# Patient Record
Sex: FEMALE | Race: WHITE | Employment: FULL TIME | URBAN - NONMETROPOLITAN AREA
[De-identification: names, ages, dates, MRNs, and addresses within clinical notes are randomized per-mention and may not be internally consistent; named-entity substitution may affect disease eponyms.]

---

## 2017-04-12 ENCOUNTER — OFFICE VISIT (OUTPATIENT)
Dept: ONCOLOGY | Age: 42
End: 2017-04-12

## 2017-04-12 VITALS
DIASTOLIC BLOOD PRESSURE: 71 MMHG | OXYGEN SATURATION: 97 % | SYSTOLIC BLOOD PRESSURE: 127 MMHG | BODY MASS INDEX: 28.54 KG/M2 | WEIGHT: 177.6 LBS | HEIGHT: 66 IN | TEMPERATURE: 97.5 F | RESPIRATION RATE: 18 BRPM | HEART RATE: 81 BPM

## 2017-04-12 DIAGNOSIS — Z79.810 USE OF TAMOXIFEN (NOLVADEX): ICD-10-CM

## 2017-04-12 DIAGNOSIS — Z90.13 H/O MASTECTOMY, BILATERAL: ICD-10-CM

## 2017-04-12 DIAGNOSIS — C50.412 MALIGNANT NEOPLASM OF UPPER-OUTER QUADRANT OF LEFT FEMALE BREAST (HCC): Primary | ICD-10-CM

## 2017-04-12 PROCEDURE — 99214 OFFICE O/P EST MOD 30 MIN: CPT | Performed by: INTERNAL MEDICINE

## 2017-04-12 PROCEDURE — G8419 CALC BMI OUT NRM PARAM NOF/U: HCPCS | Performed by: INTERNAL MEDICINE

## 2017-04-12 PROCEDURE — 1036F TOBACCO NON-USER: CPT | Performed by: INTERNAL MEDICINE

## 2017-04-12 PROCEDURE — G8427 DOCREV CUR MEDS BY ELIG CLIN: HCPCS | Performed by: INTERNAL MEDICINE

## 2017-04-12 RX ORDER — TAMOXIFEN CITRATE 20 MG/1
20 TABLET ORAL DAILY
Qty: 90 TABLET | Refills: 3 | Status: SHIPPED | OUTPATIENT
Start: 2017-04-12

## 2017-04-12 ASSESSMENT — ENCOUNTER SYMPTOMS
BACK PAIN: 0
ABDOMINAL PAIN: 0
COUGH: 0
HEMOPTYSIS: 0
NAUSEA: 0
ORTHOPNEA: 0
VOMITING: 0
CONSTIPATION: 0
SHORTNESS OF BREATH: 0
STRIDOR: 0

## 2017-10-11 ENCOUNTER — OFFICE VISIT (OUTPATIENT)
Dept: ONCOLOGY | Age: 42
End: 2017-10-11
Payer: COMMERCIAL

## 2017-10-11 ENCOUNTER — HOSPITAL ENCOUNTER (OUTPATIENT)
Dept: INFUSION THERAPY | Age: 42
Discharge: HOME | End: 2017-10-11
Payer: COMMERCIAL

## 2017-10-11 VITALS
WEIGHT: 173.2 LBS | DIASTOLIC BLOOD PRESSURE: 70 MMHG | BODY MASS INDEX: 27.83 KG/M2 | RESPIRATION RATE: 18 BRPM | HEIGHT: 66 IN | SYSTOLIC BLOOD PRESSURE: 111 MMHG | TEMPERATURE: 97.7 F | OXYGEN SATURATION: 100 % | HEART RATE: 81 BPM

## 2017-10-11 DIAGNOSIS — Z79.810 USE OF TAMOXIFEN (NOLVADEX): ICD-10-CM

## 2017-10-11 DIAGNOSIS — Z90.13 H/O MASTECTOMY, BILATERAL: ICD-10-CM

## 2017-10-11 DIAGNOSIS — C50.412 MALIGNANT NEOPLASM OF UPPER-OUTER QUADRANT OF LEFT FEMALE BREAST (HCC): Primary | ICD-10-CM

## 2017-10-11 PROCEDURE — 99211 OFF/OP EST MAY X REQ PHY/QHP: CPT

## 2017-10-11 PROCEDURE — 99214 OFFICE O/P EST MOD 30 MIN: CPT | Performed by: INTERNAL MEDICINE

## 2017-10-11 PROCEDURE — 1036F TOBACCO NON-USER: CPT | Performed by: INTERNAL MEDICINE

## 2017-10-11 PROCEDURE — G8484 FLU IMMUNIZE NO ADMIN: HCPCS | Performed by: INTERNAL MEDICINE

## 2017-10-11 PROCEDURE — G8417 CALC BMI ABV UP PARAM F/U: HCPCS | Performed by: INTERNAL MEDICINE

## 2017-10-11 PROCEDURE — G8427 DOCREV CUR MEDS BY ELIG CLIN: HCPCS | Performed by: INTERNAL MEDICINE

## 2017-10-11 ASSESSMENT — ENCOUNTER SYMPTOMS
ORTHOPNEA: 0
NAUSEA: 0
CONSTIPATION: 0
VOMITING: 0
SHORTNESS OF BREATH: 0
HEMOPTYSIS: 0
ABDOMINAL PAIN: 0
STRIDOR: 0
BACK PAIN: 0
COUGH: 0

## 2017-10-11 NOTE — PROGRESS NOTES
History of Present Illness:  Onesimo Wood is a 37 y.o. woman with a history of stage I C ER/MA (+) breast cancer diagnosis in 2009. Oncologic Hx:  Her breast cancer was initially discovered by self-exam when patient palpated a left breast mass. This was subsequently evaluated with a mammogram on 10/20/09 that revealed dense tissue and the palpable mass was not seen. An ultrasound was then performed which revealed a 1.4 cm mass at the 5:00 position along with a 5 mm anechoic area in the upper outer quadrant. Patient underwent left breast core biopsy at the 6 o'clock position on which revealed a grade 2 invasive ductal carcinoma with associated DCIS. ER/MA was 100%, Her-2/ignacio negative by FISH, and Ki-67 was 20%. Another left breast biopsy at the 11 o'clock position revealed breast tissue with papillary apocrine metaplasia. She then underwent left mastectomy and sentinel lymph node biopsy and right prophylactic mastectomy performed by Dr. Suri Murry on 11/12/09. Pathology revealed a 1.2 cm, grade 2 invasive ductal carcinoma with intermediate grade DCIS with negative margins, 0/9 sentinel/axillary nodes were positive. Right mastectomy path revealed benign breast tissue and no evidence of malignancy. She had placement of bilateral tissue expanders by Dr. sIabelle Clemons. Her tumor was sent for Oncotype DX testing which showed an intermediate recurrence score of 22 placing the patient at 14% recurrence risk with 5 years of tamoxifen. Patient declined adjuvant chemotherapy and began adjuvant hormonal therapy with tamoxifen on 12/21/09. She stopped Tamoxifen on March 10, 2014 in anticipation of trying to get pregnant. After having 2 miscarriages, she decided to try IVF which was successful and in January 2017 she delivered without any complication healthy boy.    She was seen by her surgeon at the [facility] Dr. Suri Murry in February 2017     Interim history on October 11, 2017:  She feels very well today and denies fever and weight loss. Respiratory: Negative for cough, hemoptysis, shortness of breath and stridor. Cardiovascular: Negative for chest pain, palpitations, orthopnea and leg swelling. Gastrointestinal: Negative for abdominal pain, constipation, nausea and vomiting. Genitourinary: Negative for dysuria, hematuria and urgency. Musculoskeletal: Negative for back pain and joint pain. Skin: Negative for rash. Neurological: Negative for dizziness, tingling, speech change, focal weakness and headaches. Psychiatric/Behavioral: Negative for depression. The patient does not have insomnia. Physical Exam:     Vitals:    10/11/17 0901   BP: 111/70   Pulse: 81   Resp: 18   Temp: 97.7 °F (36.5 °C)   SpO2: 100%       ECOG 0  Constitutional: She is AAOx3 and in NAD. Head: Normocephalic and atraumatic. Mouth/Throat: Oropharynx is clear and moist. No oropharyngeal exudate. Eyes: Conjunctivae are normal. No scleral icterus. Neck: Normal range of motion. Neck supple. Cardiovascular: Normal rate, regular rhythm and normal heart sounds. No murmur heard. No LE edema noted. Pulmonary/Chest: Effort normal and breath sounds normal. No respiratory distress. She has no wheezes. She has no rales. Breast/Axilla: She is status post bilateral mastectomy and reconstruction. No masses or adenopathy bilaterally. Abdominal: Soft. Bowel sounds are normal. She exhibits no distension and no mass. No tenderness. Musculoskeletal: Normal range of motion. She exhibits no joint tenderness or swelling. Lymphadenopathy: She has no cervical, supraclavicular or axillary adenopathy. Neurological: She is alert and oriented to person, place, and time. Gait normal.   Skin: Skin is warm and dry. No rash noted. She is not diaphoretic. No erythema. No pallor. Psychiatric: Affect normal.     Assessment and Plan:  Caren Liao is a 37 y.o. woman with a history of stage I ER/ND (+) breast cancer diagnosed in 2009.  She was on Tamoxifen from 2009 till March 10, 2014. In 2014 we decided to proceed with break adjuvant hormonal treatment with tamoxifen to allow the patient to get pregnant. Patient underwent IVF and in January 2017 ago she delivered without any complication healthy baby boy. She reports that after delivery she had mild mastitis involving residual breast tissue in her right breast.  Those symptoms are resolved and on today's physical examination her reconstructed breast are unremarkable. She denies having new complaints or symptoms. She is currently on extended hormonal therapy with tamoxifen. All studies showed additional benefit from 10 versus 5 years of tamoxifen treatment. She tolerates tamoxifen well, there is no evidence of disease recurrence on today's physical examination. She was instructed to continue tamoxifen I will see the patient again in my clinic in July 2018.

## 2018-08-08 ENCOUNTER — HOSPITAL ENCOUNTER (OUTPATIENT)
Dept: INFUSION THERAPY | Age: 43
Discharge: HOME | End: 2018-08-08
Payer: COMMERCIAL

## 2018-08-08 ENCOUNTER — OFFICE VISIT (OUTPATIENT)
Dept: ONCOLOGY | Age: 43
End: 2018-08-08
Payer: COMMERCIAL

## 2018-08-08 VITALS
RESPIRATION RATE: 16 BRPM | HEART RATE: 75 BPM | HEIGHT: 66 IN | WEIGHT: 173 LBS | BODY MASS INDEX: 27.8 KG/M2 | DIASTOLIC BLOOD PRESSURE: 75 MMHG | TEMPERATURE: 98.2 F | OXYGEN SATURATION: 100 % | SYSTOLIC BLOOD PRESSURE: 115 MMHG

## 2018-08-08 DIAGNOSIS — Z79.810 USE OF TAMOXIFEN (NOLVADEX): ICD-10-CM

## 2018-08-08 DIAGNOSIS — C50.412 MALIGNANT NEOPLASM OF UPPER-OUTER QUADRANT OF LEFT BREAST IN FEMALE, ESTROGEN RECEPTOR POSITIVE (HCC): Primary | ICD-10-CM

## 2018-08-08 DIAGNOSIS — Z17.0 MALIGNANT NEOPLASM OF UPPER-OUTER QUADRANT OF LEFT BREAST IN FEMALE, ESTROGEN RECEPTOR POSITIVE (HCC): Primary | ICD-10-CM

## 2018-08-08 DIAGNOSIS — Z90.13 H/O MASTECTOMY, BILATERAL: ICD-10-CM

## 2018-08-08 PROCEDURE — 1036F TOBACCO NON-USER: CPT | Performed by: INTERNAL MEDICINE

## 2018-08-08 PROCEDURE — G8417 CALC BMI ABV UP PARAM F/U: HCPCS | Performed by: INTERNAL MEDICINE

## 2018-08-08 PROCEDURE — G8427 DOCREV CUR MEDS BY ELIG CLIN: HCPCS | Performed by: INTERNAL MEDICINE

## 2018-08-08 PROCEDURE — 99211 OFF/OP EST MAY X REQ PHY/QHP: CPT

## 2018-08-08 PROCEDURE — 99213 OFFICE O/P EST LOW 20 MIN: CPT | Performed by: INTERNAL MEDICINE

## 2018-08-08 ASSESSMENT — ENCOUNTER SYMPTOMS
CONSTIPATION: 0
VOMITING: 0
ABDOMINAL PAIN: 0
BACK PAIN: 0
SHORTNESS OF BREATH: 0
ORTHOPNEA: 0
NAUSEA: 0
COUGH: 0
HEMOPTYSIS: 0
STRIDOR: 0

## 2018-08-08 NOTE — PROGRESS NOTES
from 2009 till March 10, 2014. In 2014 she had break from adjuvant hormonal treatment with tamoxifen to allow the patient to get pregnant. Patient underwent IVF and in January 2017 she delivered without any complication healthy baby boy. She restarted tamoxifen in March 2017   She is currently on extended hormonal therapy with tamoxifen. All studies showed additional benefit from 10 versus 5 years of tamoxifen treatment. She tolerates tamoxifen well, there is no evidence of disease recurrence on today's physical examination.     She will return to clinic in 1 year

## 2019-08-28 ENCOUNTER — HOSPITAL ENCOUNTER (OUTPATIENT)
Dept: INFUSION THERAPY | Age: 44
Discharge: HOME | End: 2019-08-28
Payer: COMMERCIAL

## 2019-08-28 ENCOUNTER — OFFICE VISIT (OUTPATIENT)
Dept: ONCOLOGY | Age: 44
End: 2019-08-28
Payer: COMMERCIAL

## 2019-08-28 VITALS
DIASTOLIC BLOOD PRESSURE: 73 MMHG | TEMPERATURE: 97.8 F | WEIGHT: 165.4 LBS | HEART RATE: 78 BPM | OXYGEN SATURATION: 100 % | BODY MASS INDEX: 26.58 KG/M2 | RESPIRATION RATE: 14 BRPM | HEIGHT: 66 IN | SYSTOLIC BLOOD PRESSURE: 112 MMHG

## 2019-08-28 DIAGNOSIS — C50.412 MALIGNANT NEOPLASM OF UPPER-OUTER QUADRANT OF LEFT BREAST IN FEMALE, ESTROGEN RECEPTOR POSITIVE (HCC): Primary | ICD-10-CM

## 2019-08-28 DIAGNOSIS — Z90.13 H/O MASTECTOMY, BILATERAL: ICD-10-CM

## 2019-08-28 DIAGNOSIS — Z17.0 MALIGNANT NEOPLASM OF UPPER-OUTER QUADRANT OF LEFT BREAST IN FEMALE, ESTROGEN RECEPTOR POSITIVE (HCC): Primary | ICD-10-CM

## 2019-08-28 DIAGNOSIS — Z79.810 USE OF TAMOXIFEN (NOLVADEX): ICD-10-CM

## 2019-08-28 PROCEDURE — 99211 OFF/OP EST MAY X REQ PHY/QHP: CPT

## 2019-08-28 PROCEDURE — 99214 OFFICE O/P EST MOD 30 MIN: CPT | Performed by: INTERNAL MEDICINE

## 2019-08-28 ASSESSMENT — ENCOUNTER SYMPTOMS
ORTHOPNEA: 0
SHORTNESS OF BREATH: 0
BACK PAIN: 0
HEMOPTYSIS: 0
COUGH: 0
NAUSEA: 0
STRIDOR: 0
CONSTIPATION: 0
VOMITING: 0
ABDOMINAL PAIN: 0

## 2020-08-26 ENCOUNTER — HOSPITAL ENCOUNTER (OUTPATIENT)
Dept: INFUSION THERAPY | Age: 45
Discharge: HOME | End: 2020-08-26
Payer: COMMERCIAL

## 2020-08-26 ENCOUNTER — OFFICE VISIT (OUTPATIENT)
Dept: ONCOLOGY | Age: 45
End: 2020-08-26
Payer: COMMERCIAL

## 2020-08-26 VITALS
BODY MASS INDEX: 27.06 KG/M2 | SYSTOLIC BLOOD PRESSURE: 116 MMHG | RESPIRATION RATE: 18 BRPM | TEMPERATURE: 97.9 F | OXYGEN SATURATION: 100 % | HEIGHT: 67 IN | WEIGHT: 172.4 LBS | DIASTOLIC BLOOD PRESSURE: 65 MMHG | HEART RATE: 82 BPM

## 2020-08-26 PROCEDURE — 99214 OFFICE O/P EST MOD 30 MIN: CPT | Performed by: INTERNAL MEDICINE

## 2020-08-26 PROCEDURE — 99211 OFF/OP EST MAY X REQ PHY/QHP: CPT

## 2020-08-26 ASSESSMENT — ENCOUNTER SYMPTOMS
COUGH: 0
SHORTNESS OF BREATH: 0
HEMOPTYSIS: 0
ABDOMINAL PAIN: 0
CONSTIPATION: 0
NAUSEA: 0
BACK PAIN: 0
ORTHOPNEA: 0
VOMITING: 0
STRIDOR: 0

## 2020-08-26 NOTE — PROGRESS NOTES
History of Present Illness:  Hussein Hobson is a 40 y.o. woman with a history of stage I C ER/NE (+) breast cancer diagnosis in 2009. Oncologic Hx:  Her breast cancer was initially discovered by self-exam when patient palpated a left breast mass. This was subsequently evaluated with a mammogram on 10/20/09 that revealed dense tissue and the palpable mass was not seen. An ultrasound was then performed which revealed a 1.4 cm mass at the 5:00 position along with a 5 mm anechoic area in the upper outer quadrant. Patient underwent left breast core biopsy at the 6 o'clock position on which revealed a grade 2 invasive ductal carcinoma with associated DCIS. ER/NE was 100%, Her-2/ignacio negative by FISH, and Ki-67 was 20%. Another left breast biopsy at the 11 o'clock position revealed breast tissue with papillary apocrine metaplasia. She then underwent left mastectomy and sentinel lymph node biopsy and right prophylactic mastectomy performed by Dr. Miladys Tracy on 11/12/09. Pathology revealed a 1.2 cm, grade 2 invasive ductal carcinoma with intermediate grade DCIS with negative margins, 0/9 sentinel/axillary nodes were positive. Right mastectomy path revealed benign breast tissue and no evidence of malignancy. She had placement of bilateral tissue expanders by Dr. Ruby Little. Her tumor was sent for Oncotype DX testing which showed an intermediate recurrence score of 22 placing the patient at 14% recurrence risk with 5 years of tamoxifen. Patient declined adjuvant chemotherapy and began adjuvant hormonal therapy with tamoxifen on 12/21/09. She stopped Tamoxifen on March 10, 2014 in anticipation of trying to get pregnant. After having 2 miscarriages, she decided to try IVF which was successful and in January 2017 she delivered without any complication healthy boy. She was seen by her surgeon at the [facility] Dr. Miladys Tracy in July 2020. Interim history on August 26, 2020: This is a 1 year follow-up visit.   She denies having any bone pain, no SOB, no chest wall pain. She tolerates tamoxifen well with minimal hot flashes. She reports regular periods, she had annual gynecological examination her Pap smear was normal.  The patient denies having any concerns related to her reconstructed breasts. Past Medical History    Breast CA 08/29/2009   left invasive ductal, ER,CO+, [id number] Negative    GERD (gastroesophageal reflux disease)    Hives 2007    Tattoo 6/10 & 8/10    Foot fracture, left 11/2011    Hypothyroidism 2015 started synthroid    Miscarriage within last 12 months 2015   Past Surgical History:   Procedure Laterality Date    BREAST BIOPSY Left 09/08/2009    BREAST RECONSTRUCTION  2010    LYMPH NODE BIOPSY Left 12/11/2009    Left axillary lymph node biopsy    MASTECTOMY, BILATERAL  11/12/2009    WISDOM TOOTH EXTRACTION       Current Outpatient Medications   Medication Sig Dispense Refill    tamoxifen (NOLVADEX) 20 MG tablet Take 1 tablet by mouth daily 90 tablet 3    levothyroxine (SYNTHROID) 50 MCG tablet Take 50 mcg by mouth Daily       No current facility-administered medications for this visit. Allergies   Allergen Reactions    Dha-Epa-Vitamin E Swelling   [place]ian [employer] fish, only eaten once.          Social History Main Topics    Smoking status: Never Smoker    Smokeless tobacco: Never Used    Alcohol Use: 0.0 oz/week   Comment: \"social\"    Drug Use: No    Sexual Activity: Not on file         Family History   Problem Relation Age of Onset    Colorectal Cancer Father 46   in situ, polypectomy    Cancer- Other Brother 29   bronchogenic carcinoid tumor    Cancer- Other Maternal Uncle   testicular    Uterine Cancer Maternal Grandmother 78    Diabetes Maternal Grandmother    Hypertension Maternal Grandmother    Colorectal Cancer Paternal Grandmother 80    Hypertension Paternal Grandmother    Colorectal Cancer Paternal Grandfather 80    Coronary Artery Disease Paternal normal.     Assessment and Plan:  Roxann Rooney is a 38 y.o. woman with a history of stage I ER/OH (+) breast cancer diagnosed in 2009. She was on Tamoxifen from 2009 till March 10, 2014. In 2014 she had break from adjuvant hormonal treatment with tamoxifen to allow the patient to get pregnant. Patient underwent IVF and in January 2017 she delivered without any complication healthy baby boy. She restarted tamoxifen in March 2017   She is currently on extended hormonal therapy with tamoxifen. All studies showed additional benefit from 10 versus 5 years of tamoxifen treatment. She tolerates tamoxifen well, there is no evidence of disease recurrence on today's physical examination. She needs up today with a GYN examination and Pap smear.   She denies having any complaints suspicious for metastatic breast cancer  She will return to clinic in 1 year

## 2021-08-25 ENCOUNTER — HOSPITAL ENCOUNTER (OUTPATIENT)
Dept: INFUSION THERAPY | Age: 46
Discharge: HOME | End: 2021-08-25
Payer: COMMERCIAL

## 2021-08-25 ENCOUNTER — OFFICE VISIT (OUTPATIENT)
Dept: ONCOLOGY | Age: 46
End: 2021-08-25
Payer: COMMERCIAL

## 2021-08-25 VITALS
SYSTOLIC BLOOD PRESSURE: 119 MMHG | BODY MASS INDEX: 25.99 KG/M2 | OXYGEN SATURATION: 100 % | RESPIRATION RATE: 16 BRPM | DIASTOLIC BLOOD PRESSURE: 58 MMHG | WEIGHT: 165.6 LBS | HEART RATE: 74 BPM | HEIGHT: 67 IN

## 2021-08-25 DIAGNOSIS — Z08 ENCOUNTER FOR FOLLOW-UP SURVEILLANCE OF BREAST CANCER: ICD-10-CM

## 2021-08-25 DIAGNOSIS — Z85.3 ENCOUNTER FOR FOLLOW-UP SURVEILLANCE OF BREAST CANCER: ICD-10-CM

## 2021-08-25 DIAGNOSIS — Z17.0 MALIGNANT NEOPLASM OF UPPER-OUTER QUADRANT OF LEFT BREAST IN FEMALE, ESTROGEN RECEPTOR POSITIVE (HCC): ICD-10-CM

## 2021-08-25 DIAGNOSIS — Z90.13 H/O MASTECTOMY, BILATERAL: Primary | ICD-10-CM

## 2021-08-25 DIAGNOSIS — C50.412 MALIGNANT NEOPLASM OF UPPER-OUTER QUADRANT OF LEFT BREAST IN FEMALE, ESTROGEN RECEPTOR POSITIVE (HCC): ICD-10-CM

## 2021-08-25 DIAGNOSIS — Z79.810 USE OF TAMOXIFEN (NOLVADEX): ICD-10-CM

## 2021-08-25 PROCEDURE — 99214 OFFICE O/P EST MOD 30 MIN: CPT | Performed by: INTERNAL MEDICINE

## 2021-08-25 PROCEDURE — 99211 OFF/OP EST MAY X REQ PHY/QHP: CPT

## 2021-08-25 ASSESSMENT — ENCOUNTER SYMPTOMS
VOMITING: 0
STRIDOR: 0
CONSTIPATION: 0
NAUSEA: 0
SHORTNESS OF BREATH: 0
HEMOPTYSIS: 0
BACK PAIN: 0
ORTHOPNEA: 0
ABDOMINAL PAIN: 0
COUGH: 0

## 2021-08-25 NOTE — PROGRESS NOTES
History of Present Illness:  Kati Grant is a 40 y.o. woman with a history of stage I C ER/AR (+) breast cancer diagnosis in 2009. Oncologic Hx:  Her breast cancer was initially discovered by self-exam when patient palpated a left breast mass. This was subsequently evaluated with a mammogram on 10/20/09 that revealed dense tissue and the palpable mass was not seen. An ultrasound was then performed which revealed a 1.4 cm mass at the 5:00 position along with a 5 mm anechoic area in the upper outer quadrant. Patient underwent left breast core biopsy at the 6 o'clock position on which revealed a grade 2 invasive ductal carcinoma with associated DCIS. ER/AR was 100%, Her-2/ignacio negative by FISH, and Ki-67 was 20%. Another left breast biopsy at the 11 o'clock position revealed breast tissue with papillary apocrine metaplasia. She then underwent left mastectomy and sentinel lymph node biopsy and right prophylactic mastectomy performed by Dr. Arabella Dill on 11/12/09. Pathology revealed a 1.2 cm, grade 2 invasive ductal carcinoma with intermediate grade DCIS with negative margins, 0/9 sentinel/axillary nodes were positive. Right mastectomy path revealed benign breast tissue and no evidence of malignancy. She had placement of bilateral tissue expanders by Dr. Klarissa Wesley. Her tumor was sent for Oncotype DX testing which showed an intermediate recurrence score of 22 placing the patient at 14% recurrence risk with 5 years of tamoxifen. Patient declined adjuvant chemotherapy and began adjuvant hormonal therapy with tamoxifen on 12/21/09. She stopped Tamoxifen on March 10, 2014 in anticipation of trying to get pregnant. After having 2 miscarriages, she decided to try IVF which was successful and in January 2017 she delivered without any complication healthy boy. She was seen by her surgeon at the [facility] Dr. Arabella Dill in July 2020. Interim history on August 25, 2021: This is a 1 year follow-up visit.   She denies having any bone pain, no SOB, no chest wall pain. She tolerates tamoxifen well with minimal hot flashes. She reports regular periods, she had annual gynecological examination her Pap smear was normal.  She had colonoscopy in April 2021 that showed normal findings. No polyps  The patient reports having some discomfort pain in her right reconstructed breast but after 1 month those symptoms resolved. Past Medical History    Breast CA 08/29/2009   left invasive ductal, ER,NE+, [id number] Negative    GERD (gastroesophageal reflux disease)    Hives 2007    Tattoo 6/10 & 8/10    Foot fracture, left 11/2011    Hypothyroidism 2015 started synthroid    Miscarriage within last 12 months 2015   Past Surgical History:   Procedure Laterality Date    BREAST BIOPSY Left 09/08/2009    BREAST RECONSTRUCTION  2010    LYMPH NODE BIOPSY Left 12/11/2009    Left axillary lymph node biopsy    MASTECTOMY, BILATERAL  11/12/2009    WISDOM TOOTH EXTRACTION       Current Outpatient Medications   Medication Sig Dispense Refill    tamoxifen (NOLVADEX) 20 MG tablet Take 1 tablet by mouth daily 90 tablet 3    levothyroxine (SYNTHROID) 50 MCG tablet Take 50 mcg by mouth Daily       No current facility-administered medications for this visit. Allergies   Allergen Reactions    Dha-Epa-Vitamin E Swelling   Haw[place]ian [employer] fish, only eaten once.          Social History Main Topics    Smoking status: Never Smoker    Smokeless tobacco: Never Used    Alcohol Use: 0.0 oz/week   Comment: \"social\"    Drug Use: No    Sexual Activity: Not on file         Family History   Problem Relation Age of Onset    Colorectal Cancer Father 46   in situ, polypectomy    Cancer- Other Brother 28   bronchogenic carcinoid tumor    Cancer- Other Maternal Uncle   testicular    Uterine Cancer Maternal Grandmother 78    Diabetes Maternal Grandmother    Hypertension Maternal Grandmother    Colorectal Cancer Paternal Grandmother 80 warm and dry. No rash noted. She is not diaphoretic. No erythema. No pallor. Psychiatric: Affect normal.     Assessment and Plan:  Rudy Lucas is a 41 y. o. woman with a history of stage I ER/CO (+) breast cancer diagnosed in 2009. She was on Tamoxifen from 2009 till March 10, 2014. In 2014 she had break from adjuvant hormonal treatment with tamoxifen to allow the patient to get pregnant. Patient underwent IVF and in January 2017 she delivered without any complication healthy baby boy. She restarted tamoxifen in March 2017   She is currently on extended hormonal therapy with tamoxifen. All studies showed additional benefit from 10 versus 5 years of tamoxifen treatment. She tolerates tamoxifen well, there is no evidence of disease recurrence on today's physical examination. She will complete additional 5 years of tamoxifen in March 2022. The patient had genetic testing in 2009 at [facility]. We will request report to see whether we should repeat genetic testing due to much broader genetic panel use currently. She denies having any complaints suspicious for metastatic breast cancer. She has implants for more than 12 years now. She will contact her plastic surgeon to equal site about implants replacement.   I will see the patient on as-needed basis

## 2021-11-09 ENCOUNTER — TELEPHONE (OUTPATIENT)
Dept: ONCOLOGY | Age: 46
End: 2021-11-09

## 2021-11-09 NOTE — TELEPHONE ENCOUNTER
Patient called and left message that she saw you a couple months ago, and you discussed a possible referral for genetic testing-has not heard back from you and wants to know if it is necessary for her to get for new genetic markers?  Please call at [phone]

## 2021-11-15 NOTE — TELEPHONE ENCOUNTER
Patient called again today and would like you to call her back in regards to the genetic testing discussed with her when she was here.

## 2021-11-16 NOTE — TELEPHONE ENCOUNTER
Mal talked to the patient. Her genetic testing from [facility] from 2012 reviewed.   The patient will be referred to genetic counselor from [place]

## 2021-11-29 ENCOUNTER — TELEPHONE (OUTPATIENT)
Dept: CASE MANAGEMENT | Age: 46
End: 2021-11-29

## 2021-11-29 NOTE — TELEPHONE ENCOUNTER
Name: Nora Aranda  : 1975  MRN: [id number]    Oncology Navigation Follow-Up Note    Contact Type:  Telephone    Notes: Wants to move forward with genetic lab draw. Had appt with Chan Gilmore 2021. Kit in lab.     Electronically signed by Jay Macias RN on 2021 at 2:32 PM

## 2021-12-01 ENCOUNTER — CLINICAL DOCUMENTATION (OUTPATIENT)
Dept: CASE MANAGEMENT | Age: 46
End: 2021-12-01

## 2021-12-02 ENCOUNTER — CLINICAL DOCUMENTATION (OUTPATIENT)
Dept: CASE MANAGEMENT | Age: 46
End: 2021-12-02

## 2021-12-02 NOTE — PROGRESS NOTES
Name: Conor Mitchell  : 1975  MRN: [id number]    Oncology Navigation Follow-Up Note    Contact Type:  Medical Oncology-lab draw    Notes: Genetic blood sample collected by  with Carol Mendez kit. Labeled and packaged per protocol. [employer] called for pick-up. Tracking and shipping confirmation numbers scanned into Media. Already has genetic eval with [facility]. Will follow for results.     Electronically signed by Elida Newby RN on 2021 at 7:53 AM